# Patient Record
Sex: MALE | Race: WHITE | NOT HISPANIC OR LATINO | Employment: OTHER | URBAN - NONMETROPOLITAN AREA
[De-identification: names, ages, dates, MRNs, and addresses within clinical notes are randomized per-mention and may not be internally consistent; named-entity substitution may affect disease eponyms.]

---

## 2023-08-04 NOTE — PROGRESS NOTES
Subjective    Mr. Sweeney is 72 y.o. male    Chief Complaint: Urinary Retention    History of Present Illness    72-year-old male new patient referred for recent episode of acute urinary retention found during recent ER evaluation at outlying facility initially being seen for sore throat, fever and chills, and mild burning with urination.  Patient reports he was experiencing mild difficulty with urination as well.  Was found to be retaining 1 L on bladder scan therefore an indwelling Valdez catheter was placed and 2 days later was removed during hospitalization and attempts to urinate on his own however was unsuccessful and catheter had to be replaced.  Patient reports a history of BPH and has been on 0.4 mg tamsulosin for approximately the last 5 to 10 years.  Prior to hospitalization patient reports a worsening difficulty with urination including a weakened stream, intermittency and frequency.    The following portions of the patient's history were reviewed and updated as appropriate: allergies, current medications, past family history, past medical history, past social history, past surgical history and problem list.    Review of Systems   Constitutional:  Negative for chills and fever.   Gastrointestinal:  Negative for abdominal pain, anal bleeding, blood in stool, nausea and vomiting.   Genitourinary:  Positive for decreased urine volume and difficulty urinating. Negative for dysuria, frequency, hematuria and urgency.       Current Outpatient Medications:     acetaminophen (TYLENOL) 500 MG tablet, Take 1 tablet by mouth Every 6 (Six) Hours As Needed for Mild Pain., Disp: , Rfl:     Ascorbic Acid (Vitamin C) 500 MG capsule, Take  by mouth., Disp: , Rfl:     atorvastatin (LIPITOR) 20 MG tablet, , Disp: , Rfl:     cholecalciferol (Vitamin D, Cholecalciferol,) 25 MCG (1000 UT) tablet, Take 1 tablet by mouth Daily., Disp: , Rfl:     dorzolamide (TRUSOPT) 2 % ophthalmic solution, , Disp: , Rfl:     hydroCHLOROthiazide  "(HYDRODIURIL) 25 MG tablet, , Disp: , Rfl:     latanoprost (XALATAN) 0.005 % ophthalmic solution, , Disp: , Rfl:     potassium chloride (K-DUR,KLOR-CON) 20 MEQ CR tablet, , Disp: , Rfl:     vancomycin (VANCOCIN) 125 MG capsule, , Disp: , Rfl:     Zinc 50 MG tablet, Take  by mouth., Disp: , Rfl:     tamsulosin (FLOMAX) 0.4 MG capsule 24 hr capsule, Take 1 capsule by mouth 2 (Two) Times a Day for 360 days., Disp: 180 capsule, Rfl: 3    History reviewed. No pertinent past medical history.    History reviewed. No pertinent surgical history.    Social History     Socioeconomic History    Marital status:        History reviewed. No pertinent family history.    Objective    Temp 97.6 øF (36.4 øC)   Ht 182.9 cm (72\")   Wt 134 kg (295 lb 3.2 oz)   BMI 40.04 kg/mý     Physical Exam  Constitutional:       Appearance: Normal appearance.   Abdominal:      Tenderness: There is no right CVA tenderness or left CVA tenderness.   Genitourinary:     Comments: Indwelling del rio catheter in placed draining clear yellow urine  Skin:     General: Skin is warm and dry.   Neurological:      Mental Status: He is alert and oriented to person, place, and time.   Psychiatric:         Mood and Affect: Mood normal.         Behavior: Behavior normal.           No results found for this or any previous visit.  IPSS Questionnaire (AUA-7):  Incomplete emptying  Over the past month, how often have you had a sensation of not emptying your bladder completely after you finish?: About half the time (08/10/23 1113)  Frequency  Over the past month, how often have you had to urinate again less than two hours after you finishing urinating ?: Almost always (08/10/23 1113)  Intermittency  Over the past month, how often have you found you stopped and started again several time when you urinated ?: Less thank 1 time in 5 (08/10/23 1113)  Urgency  Over the last month, how difficult  have you found it to postpone urination ?: Less than 1 time in 5 " (08/10/23 1113)  Weak Stream  Over the past month, how often have you had a weak urinary stream ?: About half the time (08/10/23 1113)  Straining  Over the past month, how often have you had to push or strain to begin urination ?: Less than 1 time 5 (08/10/23 1113)  Nocturia  Over the past month, how many times did you most typically get up to urinate from the time you went to bed until the time you got up in the morning ?: Less than 1 time in 5 (08/10/23 1113)  Quality of life due to urinary symptoms  If you were to spend the rest of your life with your urinary condition the way it is now, how would feel about that?: Mostly dissatified (08/10/23 1113)    Scores  Total IPSS Score: 15 (08/10/23 1113)  Total Score = Symtomatic Level: moderately symptomatic: 8-19 (08/10/23 1113)         Assessment and Plan    Diagnoses and all orders for this visit:    1. Urinary retention (Primary)  -     POC Urinalysis Dipstick, Multipro  -     tamsulosin (FLOMAX) 0.4 MG capsule 24 hr capsule; Take 1 capsule by mouth 2 (Two) Times a Day for 360 days.  Dispense: 180 capsule; Refill: 3      New patient and wife acute urinary retention currently with indwelling Valdez catheter in place after 1 attempt at removal after 2 to 3-day placement at outlying facility    Reports a prior history of BPH has been on 0.4 mg tamsulosin for many years    We will again attempt to remove indwelling Valdez catheter at this time have encouraged patient to increase fluid intake and if unable to urinate to report back to our office or the nearest ER within 6 to 8 hours for indwelling Valdez catheter to be replaced    At this time we will go ahead and increase the tamsulosin to twice daily dosing to see if any further improvement in voiding    Overall current AUA symptom score 15/35    If patient remains unable to urinate or fully empty recommend further evaluation with cystoscopy

## 2023-08-10 ENCOUNTER — OFFICE VISIT (OUTPATIENT)
Dept: UROLOGY | Facility: CLINIC | Age: 73
End: 2023-08-10
Payer: MEDICARE

## 2023-08-10 VITALS — WEIGHT: 295.2 LBS | BODY MASS INDEX: 39.98 KG/M2 | HEIGHT: 72 IN | TEMPERATURE: 97.6 F

## 2023-08-10 DIAGNOSIS — R33.9 URINARY RETENTION: Primary | ICD-10-CM

## 2023-08-10 RX ORDER — LATANOPROST 50 UG/ML
SOLUTION/ DROPS OPHTHALMIC
COMMUNITY
Start: 2023-06-15

## 2023-08-10 RX ORDER — POTASSIUM CHLORIDE 20 MEQ/1
TABLET, EXTENDED RELEASE ORAL
COMMUNITY
Start: 2023-08-04

## 2023-08-10 RX ORDER — VANCOMYCIN HYDROCHLORIDE 125 MG/1
CAPSULE ORAL
COMMUNITY
Start: 2023-08-07

## 2023-08-10 RX ORDER — TAMSULOSIN HYDROCHLORIDE 0.4 MG/1
CAPSULE ORAL
COMMUNITY
Start: 2023-08-01 | End: 2023-08-10

## 2023-08-10 RX ORDER — TAMSULOSIN HYDROCHLORIDE 0.4 MG/1
1 CAPSULE ORAL 2 TIMES DAILY
Qty: 180 CAPSULE | Refills: 3 | Status: SHIPPED | OUTPATIENT
Start: 2023-08-10 | End: 2024-08-04

## 2023-08-10 RX ORDER — ATORVASTATIN CALCIUM 20 MG/1
TABLET, FILM COATED ORAL
COMMUNITY
Start: 2023-08-01

## 2023-08-10 RX ORDER — DORZOLAMIDE HCL 20 MG/ML
SOLUTION/ DROPS OPHTHALMIC
COMMUNITY
Start: 2023-06-15

## 2023-08-10 RX ORDER — ACETAMINOPHEN 500 MG
500 TABLET ORAL EVERY 6 HOURS PRN
COMMUNITY

## 2023-08-10 RX ORDER — ZINC GLUCONATE 50 MG
TABLET ORAL
COMMUNITY

## 2023-08-10 RX ORDER — HYDROCHLOROTHIAZIDE 25 MG/1
TABLET ORAL
COMMUNITY
Start: 2023-08-01

## 2023-08-10 RX ORDER — MELATONIN
1000 DAILY
COMMUNITY

## 2023-08-10 RX ORDER — MULTIVIT-MIN/IRON/FOLIC ACID/K 18-600-40
CAPSULE ORAL
COMMUNITY

## 2023-08-10 NOTE — PROGRESS NOTES
Armando Sweeney is a 72 y.o. male who is here today for catheter removal. Patient of Latia CLARK. 10cc syringe used to deflate the balloon and the catheter was removed without difficulty.  The patient tolerated this well and will return in 1 month to see Latia CLARK in the office for follow up. Latia CLARK was in the office at the time of procedure.     Patient was advised to drink clear fluids for the next couple hours and urinate. The patient was also advised he may experience some blood in the urine and burning with urination for the next couple days. If the patient is unable to urinate or develops fever, chills, N&V or suprapubic pain he will call to return for an appt at clinic or seek medical treatment at [facility], PCP or Urgent Care after hours. Patient verbalized understanding and all questions were answered.   Wade  I have reviewed and agree with medical assistance documentation above

## 2023-09-01 NOTE — PROGRESS NOTES
Subjective    Mr. Sweeney is 72 y.o. male    Chief Complaint: 1 month follow up for PVR, Urinary Retention     History of Present Illness    72-year-old male established patient in for 1 month follow-up to recheck a postvoid residual after recent episode of acute urinary retention requiring indwelling Valdez catheter usage.  Patient has since remained on twice daily tamsulosin.  Reports improvement in urinary stream.  Denies any feeling of incomplete emptying.    The following portions of the patient's history were reviewed and updated as appropriate: allergies, current medications, past family history, past medical history, past social history, past surgical history and problem list.    Review of Systems   Constitutional:  Negative for chills, fatigue and fever.   Gastrointestinal:  Negative for nausea and vomiting.   Genitourinary:  Negative for decreased urine volume, difficulty urinating, frequency, hematuria, testicular pain and urgency.       Current Outpatient Medications:     acetaminophen (TYLENOL) 500 MG tablet, Take 1 tablet by mouth Every 6 (Six) Hours As Needed for Mild Pain., Disp: , Rfl:     Ascorbic Acid (Vitamin C) 500 MG capsule, Take  by mouth., Disp: , Rfl:     atorvastatin (LIPITOR) 20 MG tablet, , Disp: , Rfl:     cholecalciferol (VITAMIN D3) 25 MCG (1000 UT) tablet, Take 1 tablet by mouth Daily., Disp: , Rfl:     dorzolamide (TRUSOPT) 2 % ophthalmic solution, , Disp: , Rfl:     hydroCHLOROthiazide (HYDRODIURIL) 25 MG tablet, , Disp: , Rfl:     latanoprost (XALATAN) 0.005 % ophthalmic solution, , Disp: , Rfl:     potassium chloride (K-DUR,KLOR-CON) 20 MEQ CR tablet, , Disp: , Rfl:     tamsulosin (FLOMAX) 0.4 MG capsule 24 hr capsule, Take 1 capsule by mouth 2 (Two) Times a Day for 360 days., Disp: 180 capsule, Rfl: 3    timolol (TIMOPTIC) 0.5 % ophthalmic solution, , Disp: , Rfl:     vancomycin (VANCOCIN) 125 MG capsule, , Disp: , Rfl:     Zinc 50 MG tablet, Take  by mouth., Disp: , Rfl:  "    History reviewed. No pertinent past medical history.    History reviewed. No pertinent surgical history.    Social History     Socioeconomic History    Marital status:    Tobacco Use    Smoking status: Never    Smokeless tobacco: Never   Substance and Sexual Activity    Alcohol use: Not Currently    Drug use: Never    Sexual activity: Defer       History reviewed. No pertinent family history.    Objective    Temp 97.2 °F (36.2 °C)   Ht 182.9 cm (72\")   Wt 134 kg (296 lb 3.2 oz)   BMI 40.17 kg/m²     Physical Exam  Constitutional:       Appearance: Normal appearance.   Abdominal:      Tenderness: There is no right CVA tenderness or left CVA tenderness.   Skin:     General: Skin is warm and dry.   Neurological:      Mental Status: He is alert and oriented to person, place, and time.   Psychiatric:         Mood and Affect: Mood normal.         Behavior: Behavior normal.           No results found for this or any previous visit.  IPSS Questionnaire (AUA-7):  Incomplete emptying  Over the past month, how often have you had a sensation of not emptying your bladder completely after you finish?: Less than 1 time in 5 (09/08/23 0928)  Frequency  Over the past month, how often have you had to urinate again less than two hours after you finishing urinating ?: More than half the time (09/08/23 0928)  Intermittency  Over the past month, how often have you found you stopped and started again several time when you urinated ?: Not at all (09/08/23 0928)  Urgency  Over the last month, how difficult  have you found it to postpone urination ?: Not at all (09/08/23 0928)  Weak Stream  Over the past month, how often have you had a weak urinary stream ?: Not at all (09/08/23 0928)  Straining  Over the past month, how often have you had to push or strain to begin urination ?: Less than 1 time 5 (09/08/23 0928)  Nocturia  Over the past month, how many times did you most typically get up to urinate from the time you went to " bed until the time you got up in the morning ?: 1 time (09/08/23 0928)  Quality of life due to urinary symptoms  If you were to spend the rest of your life with your urinary condition the way it is now, how would feel about that?: Pleased (09/08/23 0928)    Scores  Total IPSS Score: 7 (09/08/23 0928)  Total Score = Symtomatic Level: Mildly symptomatic: 0-7 (09/08/23 0928)        Estimation of residual urine via abdominal ultrasound  Residual Urine: 37 ml  Indication: Urinary Retention   Position: Supine  Examination: Incremental scanning of the suprapubic area using 3 MHz transducer using copious amounts of acoustic gel.   Findings: An anechoic area was demonstrated which represented the bladder, with measurement of residual urine as noted. I inspected this myself. In that the residual urine was stable or insignificant, no treatment will be necessary at this time.       Assessment and Plan    Diagnoses and all orders for this visit:    1. Urinary retention (Primary)  -     Cancel: POC Urinalysis Dipstick, Multipro    2. BPH with obstruction/lower urinary tract symptoms  -     PSA DIAGNOSTIC      72-year-old male established patient in for 1 month follow-up to recheck a postvoid residual after recent episode of acute urinary retention requiring indwelling Valdez catheter usage.  Patient has since remained on twice daily tamsulosin.  Reports improvement in urinary stream.  Denies any feeling of incomplete emptying.      PVR 37 mL    Current AUA symptom score 7/35    Symptoms appear well controlled and not retaining large volume urine recommend continuing tamsulosin twice daily    Patient unable to recall when last PSA was drawn as he states he does not see a PCP regularly    Would like to go ahead and obtain PSA today    We will follow-up in 6 months for reevaluation

## 2023-09-08 ENCOUNTER — OFFICE VISIT (OUTPATIENT)
Dept: UROLOGY | Facility: CLINIC | Age: 73
End: 2023-09-08
Payer: MEDICARE

## 2023-09-08 VITALS — TEMPERATURE: 97.2 F | BODY MASS INDEX: 40.12 KG/M2 | HEIGHT: 72 IN | WEIGHT: 296.2 LBS

## 2023-09-08 DIAGNOSIS — N13.8 BPH WITH OBSTRUCTION/LOWER URINARY TRACT SYMPTOMS: ICD-10-CM

## 2023-09-08 DIAGNOSIS — R33.9 URINARY RETENTION: Primary | ICD-10-CM

## 2023-09-08 DIAGNOSIS — N40.1 BPH WITH OBSTRUCTION/LOWER URINARY TRACT SYMPTOMS: ICD-10-CM

## 2023-09-08 RX ORDER — TIMOLOL MALEATE 5 MG/ML
SOLUTION/ DROPS OPHTHALMIC
COMMUNITY
Start: 2023-08-19

## 2023-09-09 LAB — PSA SERPL-MCNC: 3.99 NG/ML (ref 0–4)

## 2023-09-11 ENCOUNTER — TELEPHONE (OUTPATIENT)
Dept: UROLOGY | Facility: CLINIC | Age: 73
End: 2023-09-11
Payer: MEDICARE

## 2023-09-11 NOTE — TELEPHONE ENCOUNTER
Spoke with patient and let him know his PSA was normal and that we will recheck it in a year.     Patient verbalized understanding.

## 2023-09-11 NOTE — TELEPHONE ENCOUNTER
----- Message from AMBER Barrientos sent at 9/11/2023  9:51 AM CDT -----  PSA normal. Recommend recollect in 1 year

## 2024-03-06 NOTE — PROGRESS NOTES
Subjective    Mr. Sweeney is 73 y.o. male    Chief Complaint: Follow-up acute urinary retention    History of Present Illness    73-year-old male established patient in for follow-up regarding previous episode of acute urinary retention requiring indwelling Valdez catheter usage.  Patient reports since increasing the tamsulosin to 2 tablets daily seems to no longer be having difficulty.  Denies any feeling of incomplete emptying.  Denies significant bothersome LUTS.  Patient states his main burden at this time is his vision as he has again recently had surgical intervention regarding glaucoma.    The following portions of the patient's history were reviewed and updated as appropriate: allergies, current medications, past family history, past medical history, past social history, past surgical history and problem list.    Review of Systems   Constitutional:  Negative for chills and fever.   Gastrointestinal:  Negative for abdominal pain, anal bleeding, blood in stool, nausea and vomiting.   Genitourinary:  Negative for decreased urine volume, difficulty urinating, dysuria, flank pain, frequency, hematuria and urgency.         Current Outpatient Medications:     acetaminophen (TYLENOL) 500 MG tablet, Take 1 tablet by mouth Every 6 (Six) Hours As Needed for Mild Pain., Disp: , Rfl:     Ascorbic Acid (Vitamin C) 500 MG capsule, Take  by mouth., Disp: , Rfl:     atorvastatin (LIPITOR) 20 MG tablet, , Disp: , Rfl:     cholecalciferol (VITAMIN D3) 25 MCG (1000 UT) tablet, Take 1 tablet by mouth Daily., Disp: , Rfl:     dorzolamide (TRUSOPT) 2 % ophthalmic solution, , Disp: , Rfl:     hydroCHLOROthiazide (HYDRODIURIL) 25 MG tablet, , Disp: , Rfl:     latanoprost (XALATAN) 0.005 % ophthalmic solution, , Disp: , Rfl:     potassium chloride (K-DUR,KLOR-CON) 20 MEQ CR tablet, , Disp: , Rfl:     tamsulosin (FLOMAX) 0.4 MG capsule 24 hr capsule, Take 1 capsule by mouth 2 (Two) Times a Day for 360 days., Disp: 180 capsule, Rfl: 3     "timolol (TIMOPTIC) 0.5 % ophthalmic solution, , Disp: , Rfl:     vancomycin (VANCOCIN) 125 MG capsule, , Disp: , Rfl:     Zinc 50 MG tablet, Take  by mouth., Disp: , Rfl:     History reviewed. No pertinent past medical history.    History reviewed. No pertinent surgical history.    Social History     Socioeconomic History    Marital status:    Tobacco Use    Smoking status: Never    Smokeless tobacco: Never   Substance and Sexual Activity    Alcohol use: Not Currently    Drug use: Never    Sexual activity: Defer       History reviewed. No pertinent family history.    Objective    Temp 97.6 °F (36.4 °C)   Ht 182.9 cm (72.01\")   Wt 134 kg (296 lb 4.8 oz)   BMI 40.18 kg/m²     Physical Exam  Constitutional:       Appearance: Normal appearance.   Abdominal:      Tenderness: There is no right CVA tenderness or left CVA tenderness.   Skin:     General: Skin is warm and dry.   Neurological:      Mental Status: He is alert and oriented to person, place, and time.   Psychiatric:         Mood and Affect: Mood normal.         Behavior: Behavior normal.             Results for orders placed or performed in visit on 03/08/24   POC Urinalysis Dipstick, Multipro    Specimen: Urine   Result Value Ref Range    Color Yellow Yellow, Straw, Dark Yellow, [place]    Clarity, UA Clear Clear    Glucose, UA Negative Negative mg/dL    Bilirubin Negative Negative    Ketones, UA Negative Negative    Specific Gravity  1.025 1.005 - 1.030    Blood, UA Negative Negative    pH, Urine 6.0 5.0 - 8.0    Protein, POC Negative Negative mg/dL    Urobilinogen, UA 0.2 E.U./dL Normal, 0.2 E.U./dL    Nitrite, UA Negative Negative    Leukocytes Negative Negative   Estimation of residual urine via abdominal ultrasound  Residual Urine: 35 ml  Indication: retention  Position: Supine  Examination: Incremental scanning of the suprapubic area using 3 MHz transducer using copious amounts of acoustic gel.   Findings: An anechoic area was demonstrated which " represented the bladder, with measurement of residual urine as noted. I inspected this myself. In that the residual urine was stable or insignificant, no treatment will be necessary at this time.      Assessment and Plan    Diagnoses and all orders for this visit:    1. BPH with obstruction/lower urinary tract symptoms (Primary)  -     PSA DIAGNOSTIC; Future    2. Urinary retention      PVR 35 mL    From a urinary standpoint patient appears to be doing well is no longer retaining large volume urine recommend continuing the twice daily tamsulosin as long as patient's ophthalmologist is okay with this given patient's ongoing eye issues.    Recommend follow-up in 1 year with PSA prior

## 2024-03-15 ENCOUNTER — OFFICE VISIT (OUTPATIENT)
Dept: UROLOGY | Facility: CLINIC | Age: 74
End: 2024-03-15
Payer: MEDICARE

## 2024-03-15 VITALS — HEIGHT: 72 IN | TEMPERATURE: 97.6 F | WEIGHT: 296.3 LBS | BODY MASS INDEX: 40.13 KG/M2

## 2024-03-15 DIAGNOSIS — N40.1 BPH WITH OBSTRUCTION/LOWER URINARY TRACT SYMPTOMS: Primary | ICD-10-CM

## 2024-03-15 DIAGNOSIS — R33.9 URINARY RETENTION: ICD-10-CM

## 2024-03-15 DIAGNOSIS — N13.8 BPH WITH OBSTRUCTION/LOWER URINARY TRACT SYMPTOMS: Primary | ICD-10-CM

## 2024-06-07 ENCOUNTER — TRANSCRIBE ORDERS (OUTPATIENT)
Dept: ADMINISTRATIVE | Facility: HOSPITAL | Age: 74
End: 2024-06-07
Payer: MEDICARE

## 2024-06-07 DIAGNOSIS — J01.90 ACUTE SINUSITIS, RECURRENCE NOT SPECIFIED, UNSPECIFIED LOCATION: ICD-10-CM

## 2024-06-07 DIAGNOSIS — R06.02 SOB (SHORTNESS OF BREATH): ICD-10-CM

## 2024-06-07 DIAGNOSIS — J40 BRONCHITIS: Primary | ICD-10-CM

## 2024-07-20 DIAGNOSIS — R33.9 URINARY RETENTION: ICD-10-CM

## 2024-07-22 RX ORDER — TAMSULOSIN HYDROCHLORIDE 0.4 MG/1
CAPSULE ORAL
Qty: 180 CAPSULE | Refills: 3 | Status: SHIPPED | OUTPATIENT
Start: 2024-07-22

## 2025-03-03 ENCOUNTER — TELEPHONE (OUTPATIENT)
Dept: UROLOGY | Facility: CLINIC | Age: 75
End: 2025-03-03
Payer: MEDICARE

## 2025-03-03 NOTE — TELEPHONE ENCOUNTER
Caller: ANN MARIE MENDOZA    Relationship: Emergency Contact    Best call back number: [phone]    What orders are you requesting (i.e. lab or imaging): PSA    In what timeframe would the patient need to come in: THE WEEK OF 3/10 -PRIOR TO FU APPT 3/17    Where will you receive your lab/imaging services: [facility]    Additional notes: PLEASE FAX ORDERS FOR PSA LABS TO FAX# [phone], AND CALL PT TO CONFIRM COMPLETE.

## 2025-03-05 DIAGNOSIS — N13.8 BPH WITH OBSTRUCTION/LOWER URINARY TRACT SYMPTOMS: ICD-10-CM

## 2025-03-05 DIAGNOSIS — N40.1 BPH WITH OBSTRUCTION/LOWER URINARY TRACT SYMPTOMS: ICD-10-CM

## 2025-03-07 NOTE — PROGRESS NOTES
Subjective    Mr. Sweeney is 74 y.o. male    Chief Complaint: Annual follow-up history of urinary retention    History of Present Illness    74-year-old male established patient in for annual follow-up regarding previous episode of acute urinary retention requiring indwelling Valdez catheter usage 8/2023.  Tamsulosin was increased to twice daily dosing.  Since then has had no further residual episodes.  Denies bothersome LUTS.  Patient's only complaint at present remains ongoing worsening vision for which patient has lost complete vision in his right eye due to a history of glaucoma for which has undergone 2 separate glaucoma procedures.  Now battling the left eye due to cataracts.    PSA: 0.8 3/4/2025  Lab Results   Component Value Date    PSA 3.990 09/08/2023      The following portions of the patient's history were reviewed and updated as appropriate: allergies, current medications, past family history, past medical history, past social history, past surgical history and problem list.    Review of Systems   Constitutional:  Negative for chills and fever.   Gastrointestinal:  Negative for abdominal pain, anal bleeding, blood in stool, nausea and vomiting.   Genitourinary:  Negative for dysuria, frequency, hematuria and urgency.         Current Outpatient Medications:     acetaminophen (TYLENOL) 500 MG tablet, Take 1 tablet by mouth Every 6 (Six) Hours As Needed for Mild Pain., Disp: , Rfl:     Ascorbic Acid (Vitamin C) 500 MG capsule, Take  by mouth., Disp: , Rfl:     atorvastatin (LIPITOR) 20 MG tablet, , Disp: , Rfl:     cholecalciferol (VITAMIN D3) 25 MCG (1000 UT) tablet, Take 1 tablet by mouth Daily., Disp: , Rfl:     dorzolamide (TRUSOPT) 2 % ophthalmic solution, , Disp: , Rfl:     fluticasone (FLONASE) 50 MCG/ACT nasal spray, instill two sprays into each nostril once every day, Disp: , Rfl:     hydroCHLOROthiazide (HYDRODIURIL) 25 MG tablet, , Disp: , Rfl:     latanoprost (XALATAN) 0.005 % ophthalmic solution,  ", Disp: , Rfl:     ofloxacin (OCUFLOX) 0.3 % ophthalmic solution, instill one drop in left eye four times a day, Disp: , Rfl:     omeprazole (priLOSEC) 20 MG capsule, Take 1 capsule by mouth Daily., Disp: , Rfl:     potassium chloride (K-DUR,KLOR-CON) 20 MEQ CR tablet, , Disp: , Rfl:     tamsulosin (FLOMAX) 0.4 MG capsule 24 hr capsule, TAKE 1 CAPSULE BY MOUTH 2 TIMES DAILY, Disp: 180 capsule, Rfl: 3    timolol (TIMOPTIC) 0.5 % ophthalmic solution, , Disp: , Rfl:     vancomycin (VANCOCIN) 125 MG capsule, , Disp: , Rfl:     Zinc 50 MG tablet, Take  by mouth., Disp: , Rfl:     History reviewed. No pertinent past medical history.    History reviewed. No pertinent surgical history.    Social History     Socioeconomic History    Marital status:    Tobacco Use    Smoking status: Never    Smokeless tobacco: Never   Substance and Sexual Activity    Alcohol use: Not Currently    Drug use: Never    Sexual activity: Defer       History reviewed. No pertinent family history.    Objective    Temp 96.9 °F (36.1 °C)   Ht 182.9 cm (72\")   Wt (!) 144 kg (317 lb 12.8 oz)   BMI 43.10 kg/m²     Physical Exam  Constitutional:       Appearance: Normal appearance.   Abdominal:      Tenderness: There is no right CVA tenderness or left CVA tenderness.   Skin:     General: Skin is warm and dry.   Neurological:      Mental Status: He is alert and oriented to person, place, and time.   Psychiatric:         Mood and Affect: Mood normal.         Behavior: Behavior normal.             Results for orders placed or performed in visit on 03/17/25   POC Urinalysis Dipstick, Multipro    Collection Time: 03/17/25 11:31 AM    Specimen: Urine   Result Value Ref Range    Color Yellow Yellow, Straw, Dark Yellow, [place]    Clarity, UA Clear Clear    Glucose, UA Negative Negative mg/dL    Bilirubin Negative Negative    Ketones, UA Negative Negative    Specific Gravity  1.025 1.005 - 1.030    Blood, UA Negative Negative    pH, Urine 5.5 5.0 - 8.0    " Protein, POC Trace (A) Negative mg/dL    Urobilinogen, UA 0.2 E.U./dL Normal, 0.2 E.U./dL    Nitrite, UA Negative Negative    Leukocytes Negative Negative   Estimation of residual urine via abdominal ultrasound  Residual Urine: 31 ml  Indication: BPH  Position: Supine  Examination: Incremental scanning of the suprapubic area using 3 MHz transducer using copious amounts of acoustic gel.   Findings: An anechoic area was demonstrated which represented the bladder, with measurement of residual urine as noted. I inspected this myself. In that the residual urine was stable or insignificant, no treatment will be necessary at this time.      Assessment and Plan    Diagnoses and all orders for this visit:    1. BPH with obstruction/lower urinary tract symptoms (Primary)  -     POC Urinalysis Dipstick, Multipro  -     Cancel: PSA DIAGNOSTIC; Future  -     PSA DIAGNOSTIC; Future        PVR 31 mL     Have had lengthy discussion with patient regarding his tamsulosin usage and his worsening eye function.  Patient is scheduled to undergo cataract procedure this week.  I have stressed the importance of addressing patient's tamsulosin usage with his ophthalmologist as patient reports every time he sees the physician he reviews his med list and has not commented on the tamsulosin in the past.    For now we will continue the tamsulosin unless we hear otherwise from ophthalmology    PSA appears to have significantly came back down is now well within normal limits 0.8 compared to 3.99 in 2023     Recommend follow-up in 1 year with PSA prior

## 2025-03-17 ENCOUNTER — OFFICE VISIT (OUTPATIENT)
Dept: UROLOGY | Facility: CLINIC | Age: 75
End: 2025-03-17
Payer: MEDICARE

## 2025-03-17 VITALS — WEIGHT: 315 LBS | HEIGHT: 72 IN | TEMPERATURE: 96.9 F | BODY MASS INDEX: 42.66 KG/M2

## 2025-03-17 DIAGNOSIS — N13.8 BPH WITH OBSTRUCTION/LOWER URINARY TRACT SYMPTOMS: Primary | ICD-10-CM

## 2025-03-17 DIAGNOSIS — N40.1 BPH WITH OBSTRUCTION/LOWER URINARY TRACT SYMPTOMS: Primary | ICD-10-CM

## 2025-03-17 LAB
BILIRUB BLD-MCNC: NEGATIVE MG/DL
CLARITY, POC: CLEAR
COLOR UR: YELLOW
GLUCOSE UR STRIP-MCNC: NEGATIVE MG/DL
KETONES UR QL: NEGATIVE
LEUKOCYTE EST, POC: NEGATIVE
NITRITE UR-MCNC: NEGATIVE MG/ML
PH UR: 5.5 [PH] (ref 5–8)
PROT UR STRIP-MCNC: ABNORMAL MG/DL
RBC # UR STRIP: NEGATIVE /UL
SP GR UR: 1.02 (ref 1–1.03)
UROBILINOGEN UR QL: ABNORMAL

## 2025-03-17 PROCEDURE — 1159F MED LIST DOCD IN RCRD: CPT

## 2025-03-17 PROCEDURE — 51798 US URINE CAPACITY MEASURE: CPT

## 2025-03-17 PROCEDURE — 81003 URINALYSIS AUTO W/O SCOPE: CPT

## 2025-03-17 PROCEDURE — 1160F RVW MEDS BY RX/DR IN RCRD: CPT

## 2025-03-17 PROCEDURE — 99214 OFFICE O/P EST MOD 30 MIN: CPT

## 2025-03-17 RX ORDER — OMEPRAZOLE 20 MG/1
1 CAPSULE, DELAYED RELEASE ORAL DAILY
COMMUNITY
Start: 2025-01-23

## 2025-03-17 RX ORDER — FLUTICASONE PROPIONATE 50 MCG
SPRAY, SUSPENSION (ML) NASAL
COMMUNITY
Start: 2025-03-06

## 2025-03-17 RX ORDER — OFLOXACIN 3 MG/ML
SOLUTION/ DROPS OPHTHALMIC
COMMUNITY
Start: 2025-01-22

## 2025-03-17 RX ORDER — CEFDINIR 300 MG/1
1 CAPSULE ORAL EVERY 12 HOURS SCHEDULED
COMMUNITY
Start: 2025-03-06 | End: 2025-03-17

## 2025-07-10 DIAGNOSIS — R33.9 URINARY RETENTION: ICD-10-CM

## 2025-07-10 RX ORDER — TAMSULOSIN HYDROCHLORIDE 0.4 MG/1
1 CAPSULE ORAL EVERY 12 HOURS SCHEDULED
Qty: 180 CAPSULE | Refills: 3 | Status: SHIPPED | OUTPATIENT
Start: 2025-07-10